# Patient Record
Sex: MALE | Race: WHITE | Employment: OTHER | URBAN - METROPOLITAN AREA
[De-identification: names, ages, dates, MRNs, and addresses within clinical notes are randomized per-mention and may not be internally consistent; named-entity substitution may affect disease eponyms.]

---

## 2017-02-18 ENCOUNTER — TRANSFERRED RECORDS (OUTPATIENT)
Dept: HEALTH INFORMATION MANAGEMENT | Facility: CLINIC | Age: 82
End: 2017-02-18

## 2018-08-13 ENCOUNTER — TRANSFERRED RECORDS (OUTPATIENT)
Dept: HEALTH INFORMATION MANAGEMENT | Facility: CLINIC | Age: 83
End: 2018-08-13

## 2019-01-15 ENCOUNTER — TRANSFERRED RECORDS (OUTPATIENT)
Dept: HEALTH INFORMATION MANAGEMENT | Facility: CLINIC | Age: 84
End: 2019-01-15

## 2019-01-18 ENCOUNTER — TRANSFERRED RECORDS (OUTPATIENT)
Dept: HEALTH INFORMATION MANAGEMENT | Facility: CLINIC | Age: 84
End: 2019-01-18

## 2019-02-05 ENCOUNTER — TRANSFERRED RECORDS (OUTPATIENT)
Dept: HEALTH INFORMATION MANAGEMENT | Facility: CLINIC | Age: 84
End: 2019-02-05

## 2019-03-25 ENCOUNTER — MEDICAL CORRESPONDENCE (OUTPATIENT)
Dept: HEALTH INFORMATION MANAGEMENT | Facility: CLINIC | Age: 84
End: 2019-03-25

## 2019-03-28 ENCOUNTER — TRANSFERRED RECORDS (OUTPATIENT)
Dept: HEALTH INFORMATION MANAGEMENT | Facility: CLINIC | Age: 84
End: 2019-03-28

## 2019-05-01 ENCOUNTER — DOCUMENTATION ONLY (OUTPATIENT)
Dept: SURGERY | Facility: CLINIC | Age: 84
End: 2019-05-01

## 2019-05-03 ENCOUNTER — TRANSFERRED RECORDS (OUTPATIENT)
Dept: ONCOLOGY | Facility: CLINIC | Age: 84
End: 2019-05-03

## 2019-05-03 NOTE — PROGRESS NOTES
Surgical Oncology RN Care Coordination Note:     Reviewed records provided, no imaging report or procedure reports. Patient is undergoing treatment with onctreotide injections for metastatic neuroendocrine tumor. Known mets to liver and possible lymph nodes based on records provided.     Will need all outside imaging and would pre to have procedure report with pathology report from patients initial dx.     Message sent to schedule pt for a consult.     Jaclyn Carrington RN, BSN  Care Coordinator   [phone]

## 2019-05-06 NOTE — TELEPHONE ENCOUNTER
Records (12 pages) received from Elizabeth ([facility]) & sent to Audrey Fuentes (new patient records team)

## 2019-05-06 NOTE — TELEPHONE ENCOUNTER
ONCOLOGY INTAKE: Records Information      APPT INFORMATION: 6/3/19  Referring provider:  KYLE Chang  Referring provider s clinic:  [place]/[facility]  Reason for visit/diagnosis:  gastrinoma, neuroendocrine tumor    RECORDS INFORMATION:  Were the records received with the referral (via Rightfax)? Yes (minimal records received)    Has patient been seen for any external appt for this diagnosis? Yes    If yes, where? [facility] / [facility]    Has patient had any imaging or procedures outside of [place] for this condition? Yes      If Yes, where? [facility] / [facility]    ADDITIONAL INFORMATION:  Please call Elizabeth ([facility]) with questions [phone]

## 2019-05-31 NOTE — TELEPHONE ENCOUNTER
RECORDS STATUS - ALL OTHER DIAGNOSIS      [facility]   DATE RECEIVED: *06/03/2019   NOTES STATUS DETAILS   OFFICE NOTE from referring provider YES IN EPIC   OFFICE NOTE from medical oncologist NA    DISCHARGE SUMMARY from hospital na    DISCHARGE REPORT from the ER yes In [facility]   OPERATIVE REPORT pending From [facility]   MEDICATION LIST pending From [facility]   CLINICAL TRIAL TREATMENTS TO DATE na    LABS     PATHOLOGY REPORTS pending    ANYTHING RELATED TO DIAGNOSIS na    GENONOMIC TESTING na    TYPE:     IMAGING (NEED IMAGES & REPORT) pending    CT SCANS     MRI     MAMMO     ULTRASOUND     PET       Action Audrey   Action Taken Request sent to [facility] for all record and bx reports imaging. .     Action Audrey   Action Taken Received fax back from [facility] stating no pt found at that facility. Called [facility] for more record information..

## 2019-06-03 ENCOUNTER — PRE VISIT (OUTPATIENT)
Dept: SURGERY | Facility: CLINIC | Age: 84
End: 2019-06-03

## 2019-06-03 ENCOUNTER — OFFICE VISIT (OUTPATIENT)
Dept: SURGERY | Facility: CLINIC | Age: 84
End: 2019-06-03
Attending: SURGERY
Payer: COMMERCIAL

## 2019-06-03 VITALS
OXYGEN SATURATION: 97 % | HEIGHT: 70 IN | TEMPERATURE: 97 F | RESPIRATION RATE: 16 BRPM | DIASTOLIC BLOOD PRESSURE: 74 MMHG | HEART RATE: 71 BPM | SYSTOLIC BLOOD PRESSURE: 120 MMHG

## 2019-06-03 DIAGNOSIS — R16.0 LIVER MASS: Primary | ICD-10-CM

## 2019-06-03 PROCEDURE — 40000114 ZZH STATISTIC NO CHARGE CLINIC VISIT

## 2019-06-03 RX ORDER — LORATADINE 10 MG/1
10 TABLET ORAL DAILY
COMMUNITY

## 2019-06-03 RX ORDER — ACETAMINOPHEN 325 MG/1
325-650 TABLET ORAL EVERY 6 HOURS PRN
COMMUNITY

## 2019-06-03 RX ORDER — DOCUSATE SODIUM 100 MG/1
100 CAPSULE, LIQUID FILLED ORAL DAILY
COMMUNITY

## 2019-06-03 RX ORDER — POLYETHYLENE GLYCOL 3350 17 G/17G
1 POWDER, FOR SOLUTION ORAL DAILY
COMMUNITY

## 2019-06-03 RX ORDER — LATANOPROST 50 UG/ML
1 SOLUTION/ DROPS OPHTHALMIC DAILY
COMMUNITY

## 2019-06-03 RX ORDER — SUCRALFATE ORAL 1 G/10ML
1 SUSPENSION ORAL 3 TIMES DAILY
COMMUNITY

## 2019-06-03 RX ORDER — OMEPRAZOLE 40 MG/1
40 CAPSULE, DELAYED RELEASE ORAL 2 TIMES DAILY
COMMUNITY

## 2019-06-03 ASSESSMENT — PAIN SCALES - GENERAL: PAINLEVEL: MILD PAIN (2)

## 2019-06-03 NOTE — LETTER
6/3/2019       RE: Joseph Díaz  [address]  [facility] [address]     Dear Colleague,    Thank you for referring your patient, Joseph Díaz, to the [facility]. Please see a copy of my visit note below.    DID NOT SEE THIS PATIENT DUE TO NO FILMS AVAILABLE    Again, thank you for allowing me to participate in the care of your patient.      Sincerely,    Angel Fofana MD

## 2019-06-03 NOTE — LETTER
Date:June 13, 2019      Patient was self referred, no letter generated. Do not send.        [facility] Health Information

## 2019-06-03 NOTE — NURSING NOTE
"Oncology Rooming Note    Yessenia 3, 2019 10:30 AM   Joseph Díaz is a 83 year old male who presents for:    Chief Complaint   Patient presents with     New Patient     NEW PT; GASTRINOMA, NEUROENDOCRINE TUMOR; VITALS COMPLETED BY [facility]      Initial Vitals: /74   Pulse 71   Temp 97  F (36.1  C) (Oral)   Resp 16   Ht 1.778 m (5' 10\")   SpO2 97%  There is no height or weight on file to calculate BMI. There is no height or weight on file to calculate BSA.  Mild Pain (2) Comment: Data Unavailable   No LMP for male patient.  Allergies reviewed: Yes  Medications reviewed: Yes    Medications: Medication refills not needed today.  Pharmacy name entered into EPIC: WRITTEN PRESCRIPTION REQUESTED    Clinical concerns: No new concerns today  Dr. burch was notified.      Ivett Mathur              "

## 2019-06-12 ENCOUNTER — TELEPHONE (OUTPATIENT)
Dept: TRANSPLANT | Facility: CLINIC | Age: 84
End: 2019-06-12

## 2019-06-21 ENCOUNTER — TELEPHONE (OUTPATIENT)
Dept: TRANSPLANT | Facility: CLINIC | Age: 84
End: 2019-06-21

## 2019-06-26 ENCOUNTER — TELEPHONE (OUTPATIENT)
Dept: TRANSPLANT | Facility: CLINIC | Age: 84
End: 2019-06-26

## 2019-06-26 NOTE — TELEPHONE ENCOUNTER
LVM for correctional facility liaison to call me ..still trying to schedule appointment with Dr Santana.

## 2019-07-02 ENCOUNTER — PATIENT OUTREACH (OUTPATIENT)
Dept: ONCOLOGY | Facility: CLINIC | Age: 84
End: 2019-07-02

## 2019-07-02 NOTE — PROGRESS NOTES
[facility] RN Care Coordination Note:    Called and left a message for Deana at patients facility and provided appointment info for patient to be seen. Requested a call back to confirm information. Patient will be seen by Dr. Fofana on 7/22/2019 at  9 am.     Jaclyn Carrington RN   Care Coordinator   [phone]

## 2019-07-10 NOTE — TELEPHONE ENCOUNTER
We had patients  & Last name spelled incorrectly. Fixed.    Pt needs an MARIFER to have records released from [facility], or to have an appointment scheduled within 48 hours. IB sent to Clinic.   2:43 PM

## 2019-07-10 NOTE — TELEPHONE ENCOUNTER
Spoke w/ Shirin @ [facility] - per her, Pt has no records there.     Spoke w/ Jacquie @ [place] - we may have the pt's  wrong. Confirmed pt through mailing address      [facility] Everywhere ID: [id number]. Refreshed CE, Pt has records from [facility] now viewable in [facility].       KARLA w/ Deana (on) [phone]        2:21 PM

## 2019-07-10 NOTE — TELEPHONE ENCOUNTER
RECORDS STATUS - ALL OTHER DIAGNOSIS      RECORDS RECEIVED FROM: [facility],    DATE RECEIVED:    NOTES STATUS DETAILS   OFFICE NOTE from referring provider Epic, CE Office notes from Dr. Campoverde/[facility] In Care Everywhere, Notes from [facility], most recent 3/28/19   OFFICE NOTE from medical oncologist [facility]: Dr. Preston   DISCHARGE SUMMARY from hospital     DISCHARGE REPORT from the ER     OPERATIVE REPORT     MEDICATION LIST     CLINICAL TRIAL TREATMENTS TO DATE     LABS     PATHOLOGY REPORTS  4/26/18: , [id number]    ANYTHING RELATED TO DIAGNOSIS     GENONOMIC TESTING     TYPE:     IMAGING (NEED IMAGES & REPORT)     CT SCANS [facility], Report in CE, Requested 6/28/19   MRI     MAMMO     ULTRASOUND     PET

## 2019-07-15 NOTE — TELEPHONE ENCOUNTER
Spoke w/ Alicia @ [facility] - They have one consultation note from 2/5/19 they will fax over, as well as an XR Chest from 2017 (faxing report as well.)  12:39 PM

## 2019-07-15 NOTE — TELEPHONE ENCOUNTER
Spoke w/ Kassi @ the [facility] - she will be overnighting slides, and faxing over all chart notes/relevant treatment as well as reports (imaging/pathology) today.  1:06 PM

## 2019-07-16 NOTE — TELEPHONE ENCOUNTER
Jaclyn Carrington, Sukhjinder Bland; Sarah Ramos, JOSLYN             The patients imaging is the number one thing we needed to have so we are good to go! The patient is an inmate so the information we have is likely what we will get. The initial visits issues was we had no imaging at all and we cant make surgical decisions without imaging.     Thank you for checking.     YOSVANY

## 2019-07-16 NOTE — TELEPHONE ENCOUNTER
When resolving CT Chest Abd Pelvis 6/28/19 from [facility] PACS crashed, when I reloaded PACS the image was gone. Spoke w/ Emmanuel in the Film Room - the CT is viewable to the patients PACS, under the image study 12/28/18. Emmanuel gave me his Supervisor, PACS administrator Kaushik Lr number ([phone]). Spoke w/ Kaushik and explained what happened. Kaushik was able to detach the study, and create the appropriate date for the image study.   7:35 AM

## 2019-07-17 PROCEDURE — 00000346 ZZHCL STATISTIC REVIEW OUTSIDE SLIDES TC 88321: Performed by: SURGERY

## 2019-07-18 LAB — COPATH REPORT: NORMAL

## 2019-07-22 ENCOUNTER — PRE VISIT (OUTPATIENT)
Dept: SURGERY | Facility: CLINIC | Age: 84
End: 2019-07-22

## 2019-07-22 ENCOUNTER — OFFICE VISIT (OUTPATIENT)
Dept: SURGERY | Facility: CLINIC | Age: 84
End: 2019-07-22
Attending: SURGERY
Payer: COMMERCIAL

## 2019-07-22 VITALS
SYSTOLIC BLOOD PRESSURE: 116 MMHG | HEIGHT: 70 IN | OXYGEN SATURATION: 96 % | DIASTOLIC BLOOD PRESSURE: 71 MMHG | TEMPERATURE: 97.5 F | WEIGHT: 165 LBS | RESPIRATION RATE: 16 BRPM | HEART RATE: 70 BPM | BODY MASS INDEX: 23.62 KG/M2

## 2019-07-22 DIAGNOSIS — C25.4 GASTRINOMA, MALIGNANT (H): Primary | ICD-10-CM

## 2019-07-22 PROCEDURE — G0463 HOSPITAL OUTPT CLINIC VISIT: HCPCS | Mod: ZF

## 2019-07-22 ASSESSMENT — PAIN SCALES - GENERAL: PAINLEVEL: MILD PAIN (3)

## 2019-07-22 ASSESSMENT — MIFFLIN-ST. JEOR: SCORE: 1449.69

## 2019-07-22 NOTE — NURSING NOTE
"Oncology Rooming Note    July 22, 2019 8:37 AM   Joseph Ivy is a 84 year old male who presents for:    Chief Complaint   Patient presents with     New Patient     NEW PT; GASTRINOMA, NEUROENDOCRINE TUMOR; VITALS COMPLETED BY [facility]      Initial Vitals: /71   Pulse 70   Temp 97.5  F (36.4  C) (Oral)   Resp 16   Ht 1.778 m (5' 10\")   Wt 74.8 kg (165 lb)   SpO2 96%   BMI 23.68 kg/m   Estimated body mass index is 23.68 kg/m  as calculated from the following:    Height as of this encounter: 1.778 m (5' 10\").    Weight as of this encounter: 74.8 kg (165 lb). Body surface area is 1.92 meters squared.  Mild Pain (3) Comment: Data Unavailable   No LMP for male patient.  Allergies reviewed: Yes  Medications reviewed: Yes    Medications: Medication refills not needed today.  Pharmacy name entered into EPIC: WRITTEN PRESCRIPTION REQUESTED    Clinical concerns: No new concerns today  Dr. Fofana  was notified.      Ivett Mathur              "

## 2019-07-22 NOTE — PROGRESS NOTES
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      HISTORY OF PRESENT ILLNESS:  Mr. Ivy is an 84-year-old gentleman with a history of ulcer disease who has been found to have a mass in the duodenum with significant portal lymphadenopathy as well as metastases in the liver, all of which were biopsy proven.  He has been started on multiple antiacid medications as well as long-acting Sandostatin.  His gastrin level at the time of diagnosis was greater than 10,000 and since the time of his treatment initiation has reduced down to the 7202-2549 range which has been stable for over a year now.  With regards to symptoms, Mr. Ivy states that his symptoms are significantly improved from his initial diagnosis, although he does have an ongoing sort of chronic pain that he rates at 2/10 today.  He has not had any bloody bowel movements or vomiting.  He has been hospitalized in the past for multiple episodes of bowel obstruction by his report, although not clear on the etiology of that.      I reviewed Mr. Ivy's films which revealed a mass in the duodenum as well as significant portal lymphadenopathy and a liver met.  I discussed with Mr. Ivy that given his advanced age and his requirement for a wheelchair on his presentation today, I think that surgical intervention in this case may be overly aggressive given the fact that his symptoms seem to be controlled with medication.  I discussed that based on that, I think it is reasonable to continue his ongoing medications for control of his tumor and reserve efforts at surgical or interventional procedures only in the event that he develops, for example, a bowel obstruction, etc.  Mr. Ivy seemed quite comfortable with that and did not offer really any additional questions for me today.  I recommended that he follow up with Medical Oncology for long-term treatment of this metastatic gastrinoma.  I will not plan any additional followup visit with me at this point.       A total of 30 minutes was spent with Mr. Ivy who was accompanied by 2 custodians today.

## 2019-07-22 NOTE — LETTER
7/22/2019       RE: Joseph Ivy  [address]  [place] [facility] [address]     Dear Colleague,    Thank you for referring your patient, Joseph Ivy, to the [facility]. Please see a copy of my visit note below.    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      HISTORY OF PRESENT ILLNESS:  Mr. Ivy is an 84-year-old gentleman with a history of ulcer disease who has been found to have a mass in the duodenum with significant portal lymphadenopathy as well as metastases in the liver, all of which were biopsy proven.  He has been started on multiple antiacid medications as well as long-acting Sandostatin.  His gastrin level at the time of diagnosis was greater than 10,000 and since the time of his treatment initiation has reduced down to the 6119-6188 range which has been stable for over a year now.  With regards to symptoms, Mr. Ivy states that his symptoms are significantly improved from his initial diagnosis, although he does have an ongoing sort of chronic pain that he rates at 2/10 today.  He has not had any bloody bowel movements or vomiting.  He has been hospitalized in the past for multiple episodes of bowel obstruction by his report, although not clear on the etiology of that.      I reviewed Mr. Ivy's films which revealed a mass in the duodenum as well as significant portal lymphadenopathy and a liver met.  I discussed with Mr. Ivy that given his advanced age and his requirement for a wheelchair on his presentation today, I think that surgical intervention in this case may be overly aggressive given the fact that his symptoms seem to be controlled with medication.  I discussed that based on that, I think it is reasonable to continue his ongoing medications for control of his tumor and reserve efforts at surgical or interventional procedures only in the event that he develops, for example, a bowel obstruction, etc.  Mr. Ivy seemed quite comfortable with that and did  not offer really any additional questions for me today.  I recommended that he follow up with Medical Oncology for long-term treatment of this metastatic gastrinoma.  I will not plan any additional followup visit with me at this point.      A total of 30 minutes was spent with Mr. Ivy who was accompanied by 2 custodians today.           Again, thank you for allowing me to participate in the care of your patient.      Sincerely,    Angel Fofana MD

## 2019-07-25 LAB — COPATH REPORT: NORMAL

## 2019-07-25 PROCEDURE — 00000346 ZZHCL STATISTIC REVIEW OUTSIDE SLIDES TC 88321: Performed by: SURGERY

## 2019-10-24 ENCOUNTER — PATIENT OUTREACH (OUTPATIENT)
Dept: SURGERY | Facility: CLINIC | Age: 84
End: 2019-10-24

## 2019-10-24 NOTE — PROGRESS NOTES
Surgical Oncology RN Care Coordination Note:     Reviewed imaging with Dr. Jamison and ok to proceed with scheduling. No new imaging needed a this time.     Jaclyn Carrington, RN, BSN  Care Coordinator   [phone]

## 2019-10-24 NOTE — PROGRESS NOTES
Surgical Oncology RN Care Coordination Note:     Returned call to Deana, coordinator at the detention for patient is located to discuss request for follow up with Dr. Fofana. Informed her based on the review of the note there was no recommendations for follow up with surgery for this patient unless something new had arose.     Per further discussion, Deana reviewed the recent notes and stated that there was concern of possible bowel obstruction on recent scan and they are requesting evaluation with surgery. Informed her that Dr. Fofana is out of the office at this time but we would need to get a copy of recent notes and records before scheduling. Informed her we would also need the imaging on the patient to be pushed ASAP for review to ensure patient is scheduled with most appropriate provider pending where concerns of obstruction is located. She verbalized understanding and will fax records ASAP. Provided my direct fax and will request the imaging from [facility] ASAP.     Jaclyn Carrington RN, BSN  Care Coordinator   [phone]

## 2019-10-24 NOTE — TELEPHONE ENCOUNTER
ONCOLOGY INTAKE: Records Information      APPT INFORMATION: 11/15/19 - [place] [facility]  Referring provider:  Devyn Gomez  Referring provider [facility]  Reason for visit/diagnosis:  Metastatic Neuroendocrine tumor, concerns of possible bowel obstruction  Has patient been notified of appointment date and time?: Yes per Jaclyn     RECORDS INFORMATION:  Were the records received with the referral (via Rightfax)? No    Has patient been seen for any external appt for this diagnosis? Yes    If yes, where? [facility]    Has patient had any imaging or procedures outside of [place] for this condition? Yes      If Yes, where? [facility]    ADDITIONAL INFORMATION:  Scheduled via in[employer] from Jaclyn - no call needed  Appt letter faxed to Deana at [phone] as requested

## 2019-10-25 NOTE — TELEPHONE ENCOUNTER
RECORDS STATUS - ALL OTHER DIAGNOSIS      RECORDS RECEIVED FROM: [facility] -    DATE RECEIVED: 10/25/19   NOTES STATUS DETAILS   OFFICE NOTE from referring provider [facility]    Pt last saw Dr. Fofana 7/22/19   OFFICE NOTE from medical oncologist [facility] - 10/22/19   DISCHARGE SUMMARY from hospital Epic/[facility] 12/21/17   DISCHARGE REPORT from the ER NA    OPERATIVE REPORT     MEDICATION LIST [facility]   CLINICAL TRIAL TREATMENTS TO DATE     LABS     PATHOLOGY REPORTS Epic/Complete Path Consult: 7/25/19, 7/17/19   ANYTHING RELATED TO DIAGNOSIS Epic/[facility] 10/22/19   GENONOMIC TESTING     TYPE:     IMAGING (NEED IMAGES & REPORT)     CT [facility]7/10/2011 in PACS, reports in CE    MRI     MAMMO     ULTRASOUND     PET

## 2019-11-15 ENCOUNTER — PRE VISIT (OUTPATIENT)
Dept: SURGERY | Facility: CLINIC | Age: 84
End: 2019-11-15

## 2019-11-15 ENCOUNTER — OFFICE VISIT (OUTPATIENT)
Dept: SURGERY | Facility: CLINIC | Age: 84
End: 2019-11-15
Attending: SURGERY
Payer: COMMERCIAL

## 2019-11-15 VITALS
SYSTOLIC BLOOD PRESSURE: 125 MMHG | DIASTOLIC BLOOD PRESSURE: 79 MMHG | BODY MASS INDEX: 24.34 KG/M2 | TEMPERATURE: 97.9 F | OXYGEN SATURATION: 95 % | HEIGHT: 70 IN | HEART RATE: 75 BPM | WEIGHT: 170 LBS | RESPIRATION RATE: 14 BRPM

## 2019-11-15 DIAGNOSIS — K45.8 INTERNAL HERNIA: Primary | ICD-10-CM

## 2019-11-15 PROBLEM — C7A.8: Status: ACTIVE | Noted: 2018-05-07

## 2019-11-15 PROBLEM — N39.0 URINARY TRACT INFECTION: Status: ACTIVE | Noted: 2017-12-22

## 2019-11-15 PROBLEM — D37.9: Status: ACTIVE | Noted: 2018-06-26

## 2019-11-15 PROBLEM — K26.9 DUODENAL ULCER: Status: ACTIVE | Noted: 2017-12-25

## 2019-11-15 PROBLEM — R73.9 HYPERGLYCEMIA: Status: ACTIVE | Noted: 2017-12-22

## 2019-11-15 PROBLEM — C7A.8 NEUROENDOCRINE CARCINOMA METASTATIC TO LIVER (H): Status: ACTIVE | Noted: 2018-05-07

## 2019-11-15 PROBLEM — D64.9 ANEMIA, UNSPECIFIED: Status: ACTIVE | Noted: 2018-05-07

## 2019-11-15 PROBLEM — C7B.8 NEUROENDOCRINE CARCINOMA METASTATIC TO LIVER (H): Status: ACTIVE | Noted: 2018-05-07

## 2019-11-15 PROBLEM — R26.81 UNSTEADY GAIT: Status: ACTIVE | Noted: 2017-12-22

## 2019-11-15 PROCEDURE — G0463 HOSPITAL OUTPT CLINIC VISIT: HCPCS | Mod: ZF

## 2019-11-15 RX ORDER — METOCLOPRAMIDE 10 MG/1
10 TABLET ORAL
COMMUNITY

## 2019-11-15 RX ORDER — ONDANSETRON 4 MG/1
4 TABLET, FILM COATED ORAL
COMMUNITY

## 2019-11-15 RX ORDER — FERROUS SULFATE 325(65) MG
TABLET ORAL
COMMUNITY

## 2019-11-15 RX ORDER — SIMETHICONE 125 MG
125 TABLET,CHEWABLE ORAL
COMMUNITY

## 2019-11-15 RX ORDER — TRAMADOL HYDROCHLORIDE 50 MG/1
50 TABLET ORAL
COMMUNITY

## 2019-11-15 ASSESSMENT — MIFFLIN-ST. JEOR: SCORE: 1472.36

## 2019-11-15 ASSESSMENT — PAIN SCALES - GENERAL: PAINLEVEL: MILD PAIN (2)

## 2019-11-15 NOTE — NURSING NOTE
"Oncology Rooming Note    November 15, 2019 1:50 PM   Joseph Ivy is a 84 year old male who presents for:    Chief Complaint   Patient presents with     Oncology Clinic Visit     New patient visit for metastatic neuroendocrine tumor     Initial Vitals: /79 (BP Location: Right arm, Patient Position: Chair, Cuff Size: Adult Regular)   Pulse 75   Temp 97.9  F (36.6  C)   Resp 14   Ht 1.778 m (5' 10\")   Wt 77.1 kg (170 lb)   SpO2 95%   BMI 24.39 kg/m   Estimated body mass index is 24.39 kg/m  as calculated from the following:    Height as of this encounter: 1.778 m (5' 10\").    Weight as of this encounter: 77.1 kg (170 lb). Body surface area is 1.95 meters squared.  Mild Pain (2) Comment: Data Unavailable   No LMP for male patient.  Allergies reviewed: Yes  Medications reviewed: Yes    Medications: Medication refills not needed today.  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  He reports having a recent CT scan where a small hernia was found on his small intestine, which they are concerned about.  He just wants to know the diagnosis & what the treatment plan is.  He is pleasant & cooperative.  Reports maintaining his weight, but has chronic mild abdominal pain which he states he has had for 2 or more years.   Yaquelin was notified.      Katelin Gutierrez, RN, MSN            "

## 2019-11-15 NOTE — LETTER
11/15/2019       RE: Joseph Ivy  [address]  [facility] [address]     Dear Colleague,    Thank you for referring your patient, Joseph Ivy, to the [facility]CER [facility]. Please see a copy of my visit note below.    [facility] - Surgical Oncology    NEW CONSULTATION  Nov 15, 2019    Reason for Visit:    Joseph Ivy is a 84 year old male who presents with history of metastatic gastrinoma and recently diagnosed internal hernia.  He was referred by Dr Gomez.    Pertinent Oncologic History:  84 M w/ history of metastatic gastrinoma.  On SSA with good symptom control and relatively stable disease.  Recently has had some issues with symptoms of constipation and had a CT scan which showed an internal hernia with potential partial obstruction related to the hernia.  He does not have clear obstructive symptoms and has not had nausea or vomiting.  Medications alleviated his constipation.  He presents for recommendations on management.    Pertinent Exam: Abdomen soft, non-tender, and non-distended.  Incisions well healed.  No abdominal wall hernias.    HISTORY OF PRESENT ILLNESS:  Patient feeling well and is without obstructive symptoms or symptoms of hormone excess.  No abdominal pain.  He is in a wheelchair and has significant issues with balance.  He is not independent.    Pertinent Work-Up/Findings:    CT (10/2019): Internal hernia with possible partial obstruction and transition related to the hernia.    Assessment/Counseling/Plan:    Joseph Ivy is a 84 year old male with incidentally discovered internal hernia while getting work-up for constipation.  He has medically controlled metastatic gastrinoma.  He is in a wheelchair.  I had a discussion with the patient regarding his findings.  I do not believe the constipation is being caused by the internal hernia.  In addition, the patient does not describe obstructive symptoms.  His metastatic gastrinoma is well controlled  on SSA.  His physical condition is poor and I think any operation would be a big deal for him.  I would not recommend surgical treatment of his gastrinoma at this time given his condition and good control on medications.  His gastrinoma is not causing any obstruction  Regarding his internal hernia, it appears this may be asymptomatic.  As this is not my area of specialty, I have recommended that he see a general surgeon regarding this finding.  All questions answered.  The patient was in agreement with and understanding of the plan.    Total time spent with the patient was 20 minutes, of which more than half was counseling and coordination of care.    Again, thank you for allowing me to participate in the care of your patient.      Sincerely,    Leobardo Jamison MD

## 2019-11-17 NOTE — PROGRESS NOTES
[facility] - Surgical Oncology    NEW CONSULTATION  Nov 15, 2019    Reason for Visit:    Joseph Ivy is a 84 year old male who presents with history of metastatic gastrinoma and recently diagnosed internal hernia.  He was referred by Dr Gomez.    Pertinent Oncologic History: 84 M w/ history of metastatic gastrinoma.  On SSA with good symptom control and relatively stable disease.  Recently has had some issues with symptoms of constipation and had a CT scan which showed an internal hernia with potential partial obstruction related to the hernia.  He does not have clear obstructive symptoms and has not had nausea or vomiting.  Medications alleviated his constipation.  He presents for recommendations on management.    Pertinent Exam: Abdomen soft, non-tender, and non-distended.  Incisions well healed.  No abdominal wall hernias.    HISTORY OF PRESENT ILLNESS:  Patient feeling well and is without obstructive symptoms or symptoms of hormone excess.  No abdominal pain.  He is in a wheelchair and has significant issues with balance.  He is not independent.    Pertinent Work-Up/Findings:    CT (10/2019): Internal hernia with possible partial obstruction and transition related to the hernia.    Assessment/Counseling/Plan:    Joseph Ivy is a 84 year old male with incidentally discovered internal hernia while getting work-up for constipation.  He has medically controlled metastatic gastrinoma.  He is in a wheelchair.  I had a discussion with the patient regarding his findings.  I do not believe the constipation is being caused by the internal hernia.  In addition, the patient does not describe obstructive symptoms.  His metastatic gastrinoma is well controlled on SSA.  His physical condition is poor and I think any operation would be a big deal for him.  I would not recommend surgical treatment of his gastrinoma at this time given his condition and good control on medications.  His gastrinoma is not  causing any obstruction  Regarding his internal hernia, it appears this may be asymptomatic.  As this is not my area of specialty, I have recommended that he see a general surgeon regarding this finding.  All questions answered.  The patient was in agreement with and understanding of the plan.    Total time spent with the patient was 20 minutes, of which more than half was counseling and coordination of care.
